# Patient Record
Sex: MALE | Race: WHITE | NOT HISPANIC OR LATINO | URBAN - METROPOLITAN AREA
[De-identification: names, ages, dates, MRNs, and addresses within clinical notes are randomized per-mention and may not be internally consistent; named-entity substitution may affect disease eponyms.]

---

## 2017-05-20 ENCOUNTER — EMERGENCY (EMERGENCY)
Facility: HOSPITAL | Age: 32
LOS: 1 days | Discharge: HOME | End: 2017-05-20
Attending: EMERGENCY MEDICINE | Admitting: EMERGENCY MEDICINE
Payer: COMMERCIAL

## 2017-05-20 VITALS
OXYGEN SATURATION: 100 % | RESPIRATION RATE: 16 BRPM | HEART RATE: 72 BPM | SYSTOLIC BLOOD PRESSURE: 123 MMHG | TEMPERATURE: 99 F | DIASTOLIC BLOOD PRESSURE: 81 MMHG

## 2017-05-20 VITALS
DIASTOLIC BLOOD PRESSURE: 79 MMHG | HEART RATE: 75 BPM | SYSTOLIC BLOOD PRESSURE: 121 MMHG | RESPIRATION RATE: 16 BRPM | OXYGEN SATURATION: 100 %
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
## 2017-05-20 DIAGNOSIS — M25.522 PAIN IN LEFT ELBOW: ICD-10-CM

## 2017-05-20 DIAGNOSIS — Z98.890 OTHER SPECIFIED POSTPROCEDURAL STATES: ICD-10-CM
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
## 2017-05-20 DIAGNOSIS — S50.02XA CONTUSION OF LEFT ELBOW, INITIAL ENCOUNTER: ICD-10-CM
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
## 2017-05-20 PROCEDURE — 99283 EMERGENCY DEPT VISIT LOW MDM: CPT | Mod: 25

## 2017-05-20 PROCEDURE — 99053 MED SERV 10PM-8AM 24 HR FAC: CPT

## 2017-05-20 PROCEDURE — 73080 X-RAY EXAM OF ELBOW: CPT | Mod: 26,LT

## 2017-05-20 PROCEDURE — 73080 X-RAY EXAM OF ELBOW: CPT

## 2017-05-20 RX ORDER — IBUPROFEN 200 MG
600 TABLET ORAL ONCE
Qty: 0 | Refills: 0 | Status: COMPLETED | OUTPATIENT
Start: 2017-05-20 | End: 2017-05-20

## 2017-05-20 RX ADMIN — Medication 600 MILLIGRAM(S): at 09:00

## 2017-05-20 NOTE — ED ADULT NURSE NOTE - OBJECTIVE STATEMENT
31yo male pt AxOx3 ambulatory to ED c/o L elbow pain/injury after slip and fall in the shower last night. Pt denies HA/LOC/head injury. L elbow painful upon extension. No obvious deformity. COOPER. NAD. VSS. Spouse at bedside. MD at bedside for eval.

## 2017-05-20 NOTE — ED PROVIDER NOTE - PLAN OF CARE
1. Return to ED for worsening, progressive or any other concerning symptoms   2. Follow up with your primary care doctor in 2-3days   3. Take motrin 600mg every 6 hours as needed for pain and or Take Tylenol up to 650 mg every 6 hours as needed for pain.   4. Rest, apply ice over covered skin for no more than 15 minutes at a time, keep affected extremity elevated  5. Follow up at [facility].  Dr. Royal Bird and Dr. Peewee Owusu.  [address] [place] [address].  [address].  [place].  [phone].  Tuesdays  2pm-9pm.  (check to see if your insurance company requires a referral to see a specialist).

## 2017-05-20 NOTE — ED PROVIDER NOTE - PHYSICAL EXAMINATION
Gen: NAD, AOx3  Head: NCAT  HEENT:  oral mucosa moist, normal conjunctiva  Lung: no respiratory distress  CV: Normal perfusion  MSK: No edema, no visible deformities, LT: +ttp over distal humerus and olecranon, full extension limited flexion, FROM shoulder, no clavicular ttp  Neuro: No focal neurologic deficits  Skin: No rash   Psych: normal affect

## 2017-05-20 NOTE — ED PROVIDER NOTE - OBJECTIVE STATEMENT
31yo M with slip and fall last night in shower, hit left elbow in flexed postiion on edge of tube, pain to posterior elbow worse with extension/flexion. unable to fully flex. no pain at shoulder/clavicle. no head trauma. no PMH. some paresthesias into left thumb. no weakness.     ROS: no CP/SOB. no cough. no n/v/d/c. no abd pain. no rash. no bleeding. no urinary complaints. no weakness. no vision changes. no HA. no neck/back pain. no extremity swelling.

## 2017-05-20 NOTE — ED PROVIDER NOTE - MEDICAL DECISION MAKING DETAILS
possible supraclavicular fx, unlikely dislocation, will get xray elbow, motrin, sling/splint as needed

## 2017-05-20 NOTE — ED PROVIDER NOTE - ATTENDING CONTRIBUTION TO CARE
Private Physician Ray (Bryant)  32y male pmh neg, pt comes to ed complains of pain left elbow sp slip in shower last night. No other injury or complaints. NCAT left elbow no swelling faint abrasion over olecranon with min ttp, Full rom. Distal neurovasc normal. Power 5/5   Ambrocio Whitman MD, Facep

## 2017-05-20 NOTE — ED PROVIDER NOTE - CARE PLAN
Principal Discharge DX:	Contusion of left elbow, initial encounter Principal Discharge DX:	Contusion of left elbow, initial encounter  Instructions for follow-up, activity and diet:	1. Return to ED for worsening, progressive or any other concerning symptoms   2. Follow up with your primary care doctor in 2-3days   3. Take motrin 600mg every 6 hours as needed for pain and or Take Tylenol up to 650 mg every 6 hours as needed for pain.   4. Rest, apply ice over covered skin for no more than 15 minutes at a time, keep affected extremity elevated  5. Follow up at [facility].  Dr. Royal Bird and Dr. Peewee Owusu.  [address] [place] [address].  [address].  [place].  [phone].  Tuesdays  2pm-9pm.  (check to see if your insurance company requires a referral to see a specialist).

## 2018-04-04 NOTE — ED ADULT NURSE NOTE - FALL HARM RISK TYPE OF ASSESSMENT
Discharge Note  Short Stay      SUMMARY     Admit Date: 4/4/2018    Attending Physician: Gus Chairez Jr., MD     Discharge Physician: Gus Chairez Jr., MD    Discharge Date: 4/4/2018 10:18 AM    Final Diagnosis: Screen for colon cancer [Z12.11]    Impression:          - One 1 to 2 mm polyp at the hepatic flexure,                        removed with a cold snare. Resected and retrieved.                       - Diverticulosis in the sigmoid colon and in the                        descending colon.                       - Mild colonic spasm consistent with irritable bowel                        syndrome.                       - Non-bleeding internal hemorrhoids.                       - The examination was otherwise normal.                       - The examined portion of the ileum was normal.  Recommendation:      - Discharge patient to home.                       - Await pathology results.                       - Repeat colonoscopy in 5 years for surveillance.                       - High fiber diet.                       - Take a PROBIOTIC, such as a carton of GREEK YOGURT                        (Chobani or Oikos, or Activia or Dannon); or tablets                        of ALIGN or CULTURELLE or RANGEL-Q (all                        non-prescription), every day for a month.                       - Continue present medications.                       - Resume Plavix (clopidogrel) at prior dose tomorrow.                       - Call the [facility] in 2 weeks for reports (if                        you haven't heard from us sooner) [phone].                       - Patient has a contact number available for                        emergencies. The signs and symptoms of potential                        delayed complications were discussed with the                        patient. Return to normal activities tomorrow.                        Written discharge instructions were provided to the                         patient.                       - Return to normal activities tomorrow.  Gus Chairez MD  4/4/2018     Disposition: HOME OR SELF CARE    Patient Instructions:   Current Discharge Medication List      CONTINUE these medications which have NOT CHANGED    Details   atorvastatin (LIPITOR) 10 MG tablet Take 1 tablet (10 mg total) by mouth once daily.  Qty: 90 tablet, Refills: 3      carvedilol (COREG) 3.125 MG tablet Take 1 tablet (3.125 mg total) by mouth 2 (two) times daily with meals.  Qty: 180 tablet, Refills: 1      cetirizine (ZYRTEC) 10 MG tablet Take 10 mg by mouth once daily.       clopidogrel (PLAVIX) 75 mg tablet TAKE 1 TABLET ONE TIME DAILY  Qty: 90 tablet, Refills: 3      ergocalciferol (VITAMIN D2) 50,000 unit Cap Take 1 capsule (50,000 Units total) by mouth twice a week.  Qty: 24 capsule, Refills: 1      esomeprazole (NEXIUM) 20 MG capsule Take 20 mg by mouth before breakfast.      levothyroxine (SYNTHROID) 75 MCG tablet TAKE 1 TABLET ONE TIME DAILY  Qty: 90 tablet, Refills: 1      melatonin 5 mg Tab Take by mouth every evening.       multivitamin capsule Take 1 capsule by mouth once daily.        sertraline (ZOLOFT) 50 MG tablet Take 1 tablet (50 mg total) by mouth once daily.  Qty: 90 tablet, Refills: 1      sodium,potassium,mag sulfates (SUPREP BOWEL PREP KIT) 17.5-3.13-1.6 gram SolR Take as instructed on prep sheet  Qty: 354 mL, Refills: 0      tizanidine (ZANAFLEX) 4 MG tablet Take 4 mg by mouth nightly as needed.       zolpidem (AMBIEN) 5 MG Tab Take 1 tablet (5 mg total) by mouth every evening.  Qty: 90 tablet, Refills: 0    Associated Diagnoses: Primary insomnia             Discharge Procedure Orders (must include Diet, Follow-up, Activity)    Follow Up:  Follow up with PCP as per your routine.  Please follow a high fiber diet.  Activity as tolerated.    No driving day of procedure.    PROBIOTICS:  Now that your colon is so cleaned out, now is a good time for a round of PROBIOTICS.  Eat a  container of Greek Yogurt, such as OIKOS or CHOBANI,  Or Activia or Dannon    Greek Yogurt.    Or Take a similar Probiotic product such as Align or Culturelle or Alyssa-Q, every day for a month.                  (The products listed are non-prescription, but you may need to ask the pharmacist for their location.)  Repeat this 4-6 times a year.   Daily Assessment

## 2018-04-11 ENCOUNTER — APPOINTMENT (OUTPATIENT)
Dept: SURGERY | Facility: CLINIC | Age: 33
End: 2018-04-11
Payer: COMMERCIAL

## 2018-04-11 VITALS
HEIGHT: 70 IN | DIASTOLIC BLOOD PRESSURE: 73 MMHG | BODY MASS INDEX: 28.63 KG/M2 | SYSTOLIC BLOOD PRESSURE: 111 MMHG | WEIGHT: 200 LBS | TEMPERATURE: 98.2 F | HEART RATE: 70 BPM

## 2018-04-11 DIAGNOSIS — Z78.9 OTHER SPECIFIED HEALTH STATUS: ICD-10-CM

## 2018-04-11 DIAGNOSIS — K46.9 UNSPECIFIED ABDOMINAL HERNIA W/OUT OBSTRUCTION OR GANGRENE: ICD-10-CM

## 2018-04-11 DIAGNOSIS — K42.9 UMBILICAL HERNIA W/OUT OBSTRUCTION OR GANGRENE: ICD-10-CM

## 2018-04-11 DIAGNOSIS — Z80.3 FAMILY HISTORY OF MALIGNANT NEOPLASM OF BREAST: ICD-10-CM

## 2018-04-11 PROCEDURE — 99243 OFF/OP CNSLTJ NEW/EST LOW 30: CPT

## 2018-05-04 ENCOUNTER — OUTPATIENT (OUTPATIENT)
Dept: OUTPATIENT SERVICES | Facility: HOSPITAL | Age: 33
LOS: 1 days | End: 2018-05-04

## 2018-05-04 VITALS
DIASTOLIC BLOOD PRESSURE: 80 MMHG | SYSTOLIC BLOOD PRESSURE: 120 MMHG | RESPIRATION RATE: 16 BRPM | HEART RATE: 72 BPM | WEIGHT: 197.98 LBS | TEMPERATURE: 97 F | OXYGEN SATURATION: 99 % | HEIGHT: 70 IN

## 2018-05-04 DIAGNOSIS — K42.9 UMBILICAL HERNIA WITHOUT OBSTRUCTION OR GANGRENE: ICD-10-CM

## 2018-05-04 DIAGNOSIS — K08.409 PARTIAL LOSS OF TEETH, UNSPECIFIED CAUSE, UNSPECIFIED CLASS: Chronic | ICD-10-CM

## 2018-05-04 DIAGNOSIS — Z01.818 ENCOUNTER FOR OTHER PREPROCEDURAL EXAMINATION: ICD-10-CM

## 2018-05-04 DIAGNOSIS — Z98.890 OTHER SPECIFIED POSTPROCEDURAL STATES: Chronic | ICD-10-CM

## 2018-05-04 LAB
HCT VFR BLD CALC: 44.6 % — SIGNIFICANT CHANGE UP (ref 39–50)
HGB BLD-MCNC: 14.7 G/DL — SIGNIFICANT CHANGE UP (ref 13–17)
MCHC RBC-ENTMCNC: 28.9 PG — SIGNIFICANT CHANGE UP (ref 27–34)
MCHC RBC-ENTMCNC: 33 % — SIGNIFICANT CHANGE UP (ref 32–36)
MCV RBC AUTO: 87.6 FL — SIGNIFICANT CHANGE UP (ref 80–100)
NRBC # FLD: 0 — SIGNIFICANT CHANGE UP
PLATELET # BLD AUTO: 215 K/UL — SIGNIFICANT CHANGE UP (ref 150–400)
PMV BLD: 10.9 FL — SIGNIFICANT CHANGE UP (ref 7–13)
RBC # BLD: 5.09 M/UL — SIGNIFICANT CHANGE UP (ref 4.2–5.8)
RBC # FLD: 11.9 % — SIGNIFICANT CHANGE UP (ref 10.3–14.5)
WBC # BLD: 5.08 K/UL — SIGNIFICANT CHANGE UP (ref 3.8–10.5)
WBC # FLD AUTO: 5.08 K/UL — SIGNIFICANT CHANGE UP (ref 3.8–10.5)

## 2018-05-04 NOTE — H&P PST ADULT - PROBLEM SELECTOR PLAN 1
Scheduled for umbilical hernia repair on 5/11/2018. cbc done and results pending. Surgical scrub & preop instruction given and explained. Famotidine provided with instruction. Verbalized understanding.

## 2018-05-04 NOTE — H&P PST ADULT - PSH
Fracture of bone  Repair right ring finger  H/O arthroscopy of shoulder  right 7/8/15  H/O wisdom tooth extraction  4/1/17  S/P meniscectomy  left knee 2004

## 2018-05-04 NOTE — H&P PST ADULT - NEGATIVE MUSCULOSKELETAL SYMPTOMS
no arthralgia/no arthritis/no stiffness/no back pain/no joint swelling/no myalgia/no muscle weakness/no neck pain

## 2018-05-04 NOTE — H&P PST ADULT - NEGATIVE ENMT SYMPTOMS
no nasal discharge/no nasal obstruction/no post-nasal discharge/no dysphagia/no vertigo/no sinus symptoms/no tinnitus/no hearing difficulty/no ear pain/no nasal congestion/no throat pain

## 2018-05-04 NOTE — H&P PST ADULT - HISTORY OF PRESENT ILLNESS
34 yo male presents to have PST evaluation with c/o umbilical hernia x 1 year, c/o intermittent discomfort, went for an evaluation, seen by Dr. Longoria, now scheduled for umbilical hernia repair on 5/11/2018.

## 2018-05-04 NOTE — H&P PST ADULT - NEGATIVE GENERAL GENITOURINARY SYMPTOMS
no flank pain R/normal urinary frequency/no dysuria/no hematuria/no renal colic/no urinary hesitancy/no flank pain L

## 2018-05-04 NOTE — H&P PST ADULT - NEGATIVE NEUROLOGICAL SYMPTOMS
no transient paralysis/no weakness/no paresthesias/no generalized seizures/no tremors/no vertigo/no headache/no syncope/no difficulty walking

## 2018-05-04 NOTE — H&P PST ADULT - ACTIVITY
walk daily, climb up stairs without sob, working- used to work out everyday, stopped now d/t umbilicat hernia

## 2018-05-10 ENCOUNTER — TRANSCRIPTION ENCOUNTER (OUTPATIENT)
Age: 33
End: 2018-05-10

## 2018-05-11 ENCOUNTER — APPOINTMENT (OUTPATIENT)
Dept: SURGERY | Facility: AMBULATORY SURGERY CENTER | Age: 33
End: 2018-05-11

## 2018-05-11 ENCOUNTER — OUTPATIENT (OUTPATIENT)
Dept: OUTPATIENT SERVICES | Facility: HOSPITAL | Age: 33
LOS: 1 days | Discharge: HOME | End: 2018-05-11
Payer: COMMERCIAL

## 2018-05-11 VITALS
TEMPERATURE: 97 F | HEIGHT: 70 IN | HEART RATE: 72 BPM | SYSTOLIC BLOOD PRESSURE: 120 MMHG | RESPIRATION RATE: 16 BRPM | DIASTOLIC BLOOD PRESSURE: 80 MMHG | WEIGHT: 197.98 LBS | OXYGEN SATURATION: 99 %

## 2018-05-11 VITALS
DIASTOLIC BLOOD PRESSURE: 72 MMHG | HEART RATE: 96 BPM | SYSTOLIC BLOOD PRESSURE: 121 MMHG | OXYGEN SATURATION: 98 % | RESPIRATION RATE: 16 BRPM | TEMPERATURE: 98 F

## 2018-05-11 DIAGNOSIS — K42.9 UMBILICAL HERNIA WITHOUT OBSTRUCTION OR GANGRENE: ICD-10-CM

## 2018-05-11 DIAGNOSIS — K08.409 PARTIAL LOSS OF TEETH, UNSPECIFIED CAUSE, UNSPECIFIED CLASS: Chronic | ICD-10-CM

## 2018-05-11 DIAGNOSIS — Z98.890 OTHER SPECIFIED POSTPROCEDURAL STATES: Chronic | ICD-10-CM

## 2018-05-11 PROCEDURE — 49585: CPT

## 2018-05-11 NOTE — ASU DISCHARGE PLAN (ADULT/PEDIATRIC). - MEDICATION SUMMARY - MEDICATIONS TO TAKE
I will START or STAY ON the medications listed below when I get home from the hospital:    oxyCODONE-acetaminophen 5 mg-325 mg oral tablet  -- 1 tab(s) by mouth every 6 hours, As Needed -for moderate pain MDD:4 tabs  -- Caution federal law prohibits the transfer of this drug to any person other  than the person for whom it was prescribed.  May cause drowsiness.  Alcohol may intensify this effect.  Use care when operating dangerous machinery.  This prescription cannot be refilled.  This product contains acetaminophen.  Do not use  with any other product containing acetaminophen to prevent possible liver damage.  Using more of this medication than prescribed may cause serious breathing problems.    -- Indication: For Pain

## 2018-05-23 ENCOUNTER — APPOINTMENT (OUTPATIENT)
Dept: SURGERY | Facility: CLINIC | Age: 33
End: 2018-05-23
Payer: COMMERCIAL

## 2018-05-23 VITALS
HEIGHT: 70 IN | DIASTOLIC BLOOD PRESSURE: 67 MMHG | TEMPERATURE: 97.9 F | WEIGHT: 200 LBS | BODY MASS INDEX: 28.63 KG/M2 | HEART RATE: 67 BPM | SYSTOLIC BLOOD PRESSURE: 131 MMHG

## 2018-05-23 PROCEDURE — 99024 POSTOP FOLLOW-UP VISIT: CPT

## 2018-06-06 ENCOUNTER — APPOINTMENT (OUTPATIENT)
Dept: SURGERY | Facility: CLINIC | Age: 33
End: 2018-06-06
Payer: COMMERCIAL

## 2018-06-06 VITALS — TEMPERATURE: 97.6 F | DIASTOLIC BLOOD PRESSURE: 76 MMHG | SYSTOLIC BLOOD PRESSURE: 112 MMHG | HEART RATE: 71 BPM

## 2018-06-06 PROCEDURE — 99024 POSTOP FOLLOW-UP VISIT: CPT

## 2018-06-11 ENCOUNTER — APPOINTMENT (OUTPATIENT)
Dept: SURGERY | Facility: CLINIC | Age: 33
End: 2018-06-11
Payer: COMMERCIAL

## 2018-06-11 PROCEDURE — 99024 POSTOP FOLLOW-UP VISIT: CPT

## 2018-06-20 ENCOUNTER — APPOINTMENT (OUTPATIENT)
Dept: SURGERY | Facility: CLINIC | Age: 33
End: 2018-06-20

## 2018-06-27 ENCOUNTER — APPOINTMENT (OUTPATIENT)
Dept: SURGERY | Facility: CLINIC | Age: 33
End: 2018-06-27
Payer: COMMERCIAL

## 2018-06-27 VITALS
DIASTOLIC BLOOD PRESSURE: 66 MMHG | SYSTOLIC BLOOD PRESSURE: 105 MMHG | TEMPERATURE: 93.4 F | WEIGHT: 200 LBS | HEART RATE: 75 BPM | BODY MASS INDEX: 28.63 KG/M2 | HEIGHT: 70 IN

## 2018-06-27 DIAGNOSIS — Z09 ENCOUNTER FOR FOLLOW-UP EXAMINATION AFTER COMPLETED TREATMENT FOR CONDITIONS OTHER THAN MALIGNANT NEOPLASM: ICD-10-CM

## 2018-06-27 PROCEDURE — 99024 POSTOP FOLLOW-UP VISIT: CPT

## 2018-10-15 ENCOUNTER — APPOINTMENT (OUTPATIENT)
Dept: SURGERY | Facility: CLINIC | Age: 33
End: 2018-10-15

## 2019-08-14 ENCOUNTER — APPOINTMENT (OUTPATIENT)
Dept: SURGERY | Facility: CLINIC | Age: 34
End: 2019-08-14
Payer: COMMERCIAL

## 2019-08-14 VITALS
SYSTOLIC BLOOD PRESSURE: 106 MMHG | OXYGEN SATURATION: 98 % | TEMPERATURE: 98.2 F | BODY MASS INDEX: 28.92 KG/M2 | WEIGHT: 202 LBS | HEIGHT: 70 IN | DIASTOLIC BLOOD PRESSURE: 65 MMHG | HEART RATE: 75 BPM

## 2019-08-14 PROCEDURE — 99213 OFFICE O/P EST LOW 20 MIN: CPT

## 2019-08-15 ENCOUNTER — OUTPATIENT (OUTPATIENT)
Dept: OUTPATIENT SERVICES | Facility: HOSPITAL | Age: 34
LOS: 1 days | End: 2019-08-15
Payer: COMMERCIAL

## 2019-08-15 VITALS
TEMPERATURE: 98 F | OXYGEN SATURATION: 98 % | SYSTOLIC BLOOD PRESSURE: 136 MMHG | WEIGHT: 203.05 LBS | DIASTOLIC BLOOD PRESSURE: 84 MMHG | RESPIRATION RATE: 16 BRPM | HEIGHT: 70 IN | HEART RATE: 70 BPM

## 2019-08-15 DIAGNOSIS — S83.232A COMPLEX TEAR OF MEDIAL MENISCUS, CURRENT INJURY, LEFT KNEE, INITIAL ENCOUNTER: ICD-10-CM

## 2019-08-15 DIAGNOSIS — K08.409 PARTIAL LOSS OF TEETH, UNSPECIFIED CAUSE, UNSPECIFIED CLASS: Chronic | ICD-10-CM

## 2019-08-15 DIAGNOSIS — Z98.890 OTHER SPECIFIED POSTPROCEDURAL STATES: Chronic | ICD-10-CM

## 2019-08-15 DIAGNOSIS — Z01.818 ENCOUNTER FOR OTHER PREPROCEDURAL EXAMINATION: ICD-10-CM

## 2019-08-15 LAB
ALBUMIN SERPL ELPH-MCNC: 4.1 G/DL — SIGNIFICANT CHANGE UP (ref 3.3–5)
ALP SERPL-CCNC: 40 U/L — SIGNIFICANT CHANGE UP (ref 40–120)
ALT FLD-CCNC: 51 U/L — SIGNIFICANT CHANGE UP (ref 12–78)
ANION GAP SERPL CALC-SCNC: 5 MMOL/L — SIGNIFICANT CHANGE UP (ref 5–17)
AST SERPL-CCNC: 41 U/L — HIGH (ref 15–37)
BILIRUB SERPL-MCNC: 0.4 MG/DL — SIGNIFICANT CHANGE UP (ref 0.2–1.2)
BUN SERPL-MCNC: 17 MG/DL — SIGNIFICANT CHANGE UP (ref 7–23)
CALCIUM SERPL-MCNC: 9.3 MG/DL — SIGNIFICANT CHANGE UP (ref 8.5–10.1)
CHLORIDE SERPL-SCNC: 105 MMOL/L — SIGNIFICANT CHANGE UP (ref 96–108)
CO2 SERPL-SCNC: 30 MMOL/L — SIGNIFICANT CHANGE UP (ref 22–31)
CREAT SERPL-MCNC: 1 MG/DL — SIGNIFICANT CHANGE UP (ref 0.5–1.3)
GLUCOSE SERPL-MCNC: 84 MG/DL — SIGNIFICANT CHANGE UP (ref 70–99)
HCT VFR BLD CALC: 44.4 % — SIGNIFICANT CHANGE UP (ref 39–50)
HGB BLD-MCNC: 13.9 G/DL — SIGNIFICANT CHANGE UP (ref 13–17)
MCHC RBC-ENTMCNC: 28.2 PG — SIGNIFICANT CHANGE UP (ref 27–34)
MCHC RBC-ENTMCNC: 31.3 GM/DL — LOW (ref 32–36)
MCV RBC AUTO: 90.1 FL — SIGNIFICANT CHANGE UP (ref 80–100)
NRBC # BLD: 0 /100 WBCS — SIGNIFICANT CHANGE UP (ref 0–0)
PLATELET # BLD AUTO: 280 K/UL — SIGNIFICANT CHANGE UP (ref 150–400)
POTASSIUM SERPL-MCNC: 4.2 MMOL/L — SIGNIFICANT CHANGE UP (ref 3.5–5.3)
POTASSIUM SERPL-SCNC: 4.2 MMOL/L — SIGNIFICANT CHANGE UP (ref 3.5–5.3)
PROT SERPL-MCNC: 7.8 G/DL — SIGNIFICANT CHANGE UP (ref 6–8.3)
RBC # BLD: 4.93 M/UL — SIGNIFICANT CHANGE UP (ref 4.2–5.8)
RBC # FLD: 12.5 % — SIGNIFICANT CHANGE UP (ref 10.3–14.5)
SODIUM SERPL-SCNC: 140 MMOL/L — SIGNIFICANT CHANGE UP (ref 135–145)
WBC # BLD: 4.89 K/UL — SIGNIFICANT CHANGE UP (ref 3.8–10.5)
WBC # FLD AUTO: 4.89 K/UL — SIGNIFICANT CHANGE UP (ref 3.8–10.5)

## 2019-08-15 PROCEDURE — G0463: CPT

## 2019-08-15 PROCEDURE — 36415 COLL VENOUS BLD VENIPUNCTURE: CPT

## 2019-08-15 PROCEDURE — 80053 COMPREHEN METABOLIC PANEL: CPT

## 2019-08-15 PROCEDURE — 85027 COMPLETE CBC AUTOMATED: CPT

## 2019-08-15 RX ORDER — KETOCONAZOLE 20 MG/G
0 AEROSOL, FOAM TOPICAL
Qty: 0 | Refills: 0 | DISCHARGE

## 2019-08-15 RX ORDER — KETOCONAZOLE 20 MG/G
0 AEROSOL, FOAM TOPICAL
Qty: 30 | Refills: 0 | DISCHARGE

## 2019-08-15 RX ORDER — OMEGA-3 ACID ETHYL ESTERS 1 G
0 CAPSULE ORAL
Qty: 0 | Refills: 0 | DISCHARGE

## 2019-08-15 NOTE — H&P PST ADULT - HISTORY OF PRESENT ILLNESS
33 yo male with ADD, present to [facility] scheduled for left knee arthroscopy / meniscectomy / chondroplasty on 8/22 with Dr. Nunez. Reports tearing his medial meniscus and PCL in his left knee after a fall in April 2019. Reports pain with ROM. Rates his pain today a 5/10. Denies current use of pain meds.

## 2019-08-15 NOTE — H&P PST ADULT - ASSESSMENT
35 yo male with a complex tear of medial meniscus of left knee scheduled for left knee arthroscopy / meniscectomy / chondroplasty on 8/22 with Dr. Nunez

## 2019-08-15 NOTE — H&P PST ADULT - NSICDXPASTSURGICALHX_GEN_ALL_CORE_FT
PAST SURGICAL HISTORY:  Fracture of bone Repair right ring finger    H/O arthroscopy of shoulder Rigth 6/8/15    H/O umbilical hernia repair 5/11/2018    H/O wisdom tooth extraction 4/1/17    S/P meniscectomy left knee 2004

## 2019-08-15 NOTE — H&P PST ADULT - NSICDXPASTMEDICALHX_GEN_ALL_CORE_FT
PAST MEDICAL HISTORY:  ADD (attention deficit disorder)     Complex tear of medial meniscus, current injury, left knee, initial encounter

## 2019-08-15 NOTE — H&P PST ADULT - NSICDXPROBLEM_GEN_ALL_CORE_FT
PROBLEM DIAGNOSES  Problem: Complex tear of medial meniscus, current injury, left knee, initial encounter  Assessment and Plan: scheduled for left knee arthroscopy / meniscectomy / chondroplasty on 8/22 with Dr. Nunez    Problem: Pre-op evaluation  Assessment and Plan: Labs - CBC and CMP  No MC needed  Pre op and Hibiclens instructions reviewed and given. Hold fish oil and workout supplements for one week. Avoid NSAIDs and OTC supplements. Verbalized understanding

## 2019-08-15 NOTE — H&P PST ADULT - NSICDXFAMILYHX_GEN_ALL_CORE_FT
FAMILY HISTORY:  Mother  Still living? Yes, Estimated age: Age Unknown  Family history of breast cancer in mother, Age at diagnosis: Age Unknown

## 2019-08-21 ENCOUNTER — TRANSCRIPTION ENCOUNTER (OUTPATIENT)
Age: 34
End: 2019-08-21

## 2019-08-22 ENCOUNTER — OUTPATIENT (OUTPATIENT)
Dept: OUTPATIENT SERVICES | Facility: HOSPITAL | Age: 34
LOS: 1 days | End: 2019-08-22
Payer: COMMERCIAL

## 2019-08-22 ENCOUNTER — RESULT REVIEW (OUTPATIENT)
Age: 34
End: 2019-08-22

## 2019-08-22 VITALS
HEART RATE: 91 BPM | TEMPERATURE: 98 F | RESPIRATION RATE: 12 BRPM | OXYGEN SATURATION: 98 % | DIASTOLIC BLOOD PRESSURE: 70 MMHG | SYSTOLIC BLOOD PRESSURE: 133 MMHG

## 2019-08-22 VITALS
SYSTOLIC BLOOD PRESSURE: 145 MMHG | HEART RATE: 82 BPM | RESPIRATION RATE: 15 BRPM | TEMPERATURE: 99 F | WEIGHT: 203.05 LBS | OXYGEN SATURATION: 98 % | DIASTOLIC BLOOD PRESSURE: 76 MMHG | HEIGHT: 70 IN

## 2019-08-22 DIAGNOSIS — Z01.818 ENCOUNTER FOR OTHER PREPROCEDURAL EXAMINATION: ICD-10-CM

## 2019-08-22 DIAGNOSIS — K08.409 PARTIAL LOSS OF TEETH, UNSPECIFIED CAUSE, UNSPECIFIED CLASS: Chronic | ICD-10-CM

## 2019-08-22 DIAGNOSIS — S83.232A COMPLEX TEAR OF MEDIAL MENISCUS, CURRENT INJURY, LEFT KNEE, INITIAL ENCOUNTER: ICD-10-CM

## 2019-08-22 DIAGNOSIS — Z98.890 OTHER SPECIFIED POSTPROCEDURAL STATES: Chronic | ICD-10-CM

## 2019-08-22 PROCEDURE — 88304 TISSUE EXAM BY PATHOLOGIST: CPT | Mod: 26

## 2019-08-22 PROCEDURE — 88304 TISSUE EXAM BY PATHOLOGIST: CPT

## 2019-08-22 PROCEDURE — 29881 ARTHRS KNE SRG MNISECTMY M/L: CPT | Mod: LT

## 2019-08-22 RX ORDER — SODIUM CHLORIDE 9 MG/ML
1000 INJECTION, SOLUTION INTRAVENOUS
Refills: 0 | Status: DISCONTINUED | OUTPATIENT
Start: 2019-08-22 | End: 2019-09-11

## 2019-08-22 RX ORDER — CEFAZOLIN SODIUM 1 G
2000 VIAL (EA) INJECTION ONCE
Refills: 0 | Status: COMPLETED | OUTPATIENT
Start: 2019-08-22 | End: 2019-08-22

## 2019-08-22 RX ORDER — OXYCODONE HYDROCHLORIDE 5 MG/1
5 TABLET ORAL ONCE
Refills: 0 | Status: DISCONTINUED | OUTPATIENT
Start: 2019-08-22 | End: 2019-08-22

## 2019-08-22 RX ORDER — SODIUM CHLORIDE 9 MG/ML
1000 INJECTION, SOLUTION INTRAVENOUS
Refills: 0 | Status: DISCONTINUED | OUTPATIENT
Start: 2019-08-22 | End: 2019-08-22

## 2019-08-22 RX ORDER — DOCUSATE SODIUM 100 MG
1 CAPSULE ORAL
Qty: 6 | Refills: 0
Start: 2019-08-22 | End: 2019-08-24

## 2019-08-22 RX ORDER — OXYCODONE HYDROCHLORIDE 5 MG/1
10 TABLET ORAL ONCE
Refills: 0 | Status: DISCONTINUED | OUTPATIENT
Start: 2019-08-22 | End: 2019-08-22

## 2019-08-22 RX ORDER — ONDANSETRON 8 MG/1
4 TABLET, FILM COATED ORAL ONCE
Refills: 0 | Status: COMPLETED | OUTPATIENT
Start: 2019-08-22 | End: 2019-08-22

## 2019-08-22 RX ORDER — HYDROMORPHONE HYDROCHLORIDE 2 MG/ML
0.5 INJECTION INTRAMUSCULAR; INTRAVENOUS; SUBCUTANEOUS
Refills: 0 | Status: DISCONTINUED | OUTPATIENT
Start: 2019-08-22 | End: 2019-08-22

## 2019-08-22 RX ADMIN — ONDANSETRON 4 MILLIGRAM(S): 8 TABLET, FILM COATED ORAL at 14:41

## 2019-08-22 RX ADMIN — SODIUM CHLORIDE 75 MILLILITER(S): 9 INJECTION, SOLUTION INTRAVENOUS at 12:16

## 2019-08-22 RX ADMIN — SODIUM CHLORIDE 75 MILLILITER(S): 9 INJECTION, SOLUTION INTRAVENOUS at 14:44

## 2019-08-22 RX ADMIN — HYDROMORPHONE HYDROCHLORIDE 0.5 MILLIGRAM(S): 2 INJECTION INTRAMUSCULAR; INTRAVENOUS; SUBCUTANEOUS at 15:02

## 2019-08-22 RX ADMIN — HYDROMORPHONE HYDROCHLORIDE 0.5 MILLIGRAM(S): 2 INJECTION INTRAMUSCULAR; INTRAVENOUS; SUBCUTANEOUS at 14:52

## 2019-08-22 NOTE — ASU PATIENT PROFILE, ADULT - PMH
ADD (attention deficit disorder)    Complex tear of medial meniscus, current injury, left knee, initial encounter

## 2019-08-22 NOTE — ASU PATIENT PROFILE, ADULT - PSH
Fracture of bone  Repair right ring finger  H/O arthroscopy of shoulder  [place] 6/8/15  H/O umbilical hernia repair  5/11/2018  H/O wisdom tooth extraction  4/1/17  S/P meniscectomy  left knee 2004

## 2019-08-22 NOTE — ASU DISCHARGE PLAN (ADULT/PEDIATRIC) - ASU DC SPECIAL INSTRUCTIONSFT
Knee Arthroscopy Instructions    1) Your knee will swell over the next 48hours. Ice your Knee plenty, continuously if possible. Fill up a plastic bag, then wrap it in a towel or pillow case.     2) Elevate your leg above your heart with about 3 pillows when you can, when you are in bed or chair. Otherwise you should be up and about, walking as much as you can tolerate. The more you move the better you will do. Weight bearing as tolerated.    3) BANDAGE: Expect some mild bloody drainage. This is mostly leftover Saline fluid coming out of your knee from surgery. Just place another Gauze and another ACE over it and wrap it snug but not overly tight. If it bleeds through the second bandage again, call.    4) SHOWER: Bandage in will be changed in 48 - 72 hrs and Waterproof Band Aides placed when you see Dr. Nunez. You can shower in 48 hours. No bath. Pat your incisions dry. No creams, no lotions.    5) If pain gets so severe that you cannot feel or wiggle your toes, or if you have high fever you need to call or come to the ER. But as long as you can feel and wiggle your toes, you are usually fine.    6) Call the office to schedule a follow up appointment to see Dr. Nunez in 48-72 hrs days. Sutures will be removed in 10-14 days.    7) A pain Rx was sent electronically to your pharmacy, pick it up on the way home.

## 2019-08-22 NOTE — ASU DISCHARGE PLAN (ADULT/PEDIATRIC) - CARE PROVIDER_API CALL
Abdi Nunez)  Orthopaedic Surgery Surgery  [address] [place] [address]  Phone: [phone]  Fax: [phone]  Follow Up Time: 1-3 days

## 2019-08-22 NOTE — ASU DISCHARGE PLAN (ADULT/PEDIATRIC) - CALL YOUR DOCTOR IF YOU HAVE ANY OF THE FOLLOWING:
Bleeding that does not stop/Fever greater than (need to indicate Fahrenheit or Celsius)/Swelling that gets worse/Wound/Surgical Site with redness, or foul smelling discharge or pus/Numbness, tingling, color or temperature change to extremity

## 2019-08-26 LAB — SURGICAL PATHOLOGY STUDY: SIGNIFICANT CHANGE UP

## 2020-01-01 ENCOUNTER — TRANSCRIPTION ENCOUNTER (OUTPATIENT)
Age: 35
End: 2020-01-01

## 2020-12-10 ENCOUNTER — TRANSCRIPTION ENCOUNTER (OUTPATIENT)
Age: 35
End: 2020-12-10

## 2020-12-18 ENCOUNTER — TRANSCRIPTION ENCOUNTER (OUTPATIENT)
Age: 35
End: 2020-12-18

## 2021-12-29 ENCOUNTER — TRANSCRIPTION ENCOUNTER (OUTPATIENT)
Age: 36
End: 2021-12-29

## 2022-12-01 NOTE — ASU DISCHARGE PLAN (ADULT/PEDIATRIC). - NOTIFY
Pain not relieved by Medications/Swelling that continues/Numbness, color, or temperature change to extremity/Persistent Nausea and Vomiting/Bleeding that does not stop/Unable to Urinate/Fever greater than 101 You can access the FollowMyHealth Patient Portal offered by [facility] by registering at the following website: [email or web address]. By joining [facility]’s FollowMyHealth portal, you will also be able to view your health information using other applications (apps) compatible with our system.

## 2025-07-16 ENCOUNTER — NON-APPOINTMENT (OUTPATIENT)
Age: 40
End: 2025-07-16